# Patient Record
Sex: MALE | Race: WHITE | NOT HISPANIC OR LATINO
[De-identification: names, ages, dates, MRNs, and addresses within clinical notes are randomized per-mention and may not be internally consistent; named-entity substitution may affect disease eponyms.]

---

## 2021-07-13 PROBLEM — Z00.00 ENCOUNTER FOR PREVENTIVE HEALTH EXAMINATION: Status: ACTIVE | Noted: 2021-07-13

## 2021-08-02 ENCOUNTER — APPOINTMENT (OUTPATIENT)
Dept: SURGERY | Facility: CLINIC | Age: 46
End: 2021-08-02
Payer: MEDICAID

## 2021-08-02 VITALS
HEIGHT: 67 IN | HEART RATE: 97 BPM | SYSTOLIC BLOOD PRESSURE: 153 MMHG | WEIGHT: 243 LBS | OXYGEN SATURATION: 95 % | DIASTOLIC BLOOD PRESSURE: 84 MMHG | BODY MASS INDEX: 38.14 KG/M2 | TEMPERATURE: 97.3 F

## 2021-08-02 DIAGNOSIS — Z80.1 FAMILY HISTORY OF MALIGNANT NEOPLASM OF TRACHEA, BRONCHUS AND LUNG: ICD-10-CM

## 2021-08-02 DIAGNOSIS — Z83.3 FAMILY HISTORY OF DIABETES MELLITUS: ICD-10-CM

## 2021-08-02 DIAGNOSIS — Z78.9 OTHER SPECIFIED HEALTH STATUS: ICD-10-CM

## 2021-08-02 PROCEDURE — 99072 ADDL SUPL MATRL&STAF TM PHE: CPT

## 2021-08-02 PROCEDURE — 99243 OFF/OP CNSLTJ NEW/EST LOW 30: CPT

## 2021-08-02 NOTE — CONSULT LETTER
[Dear  ___] : Dear  [unfilled], [Consult Letter:] : I had the pleasure of evaluating your patient, [unfilled]. [Please see my note below.] : Please see my note below. [Consult Closing:] : Thank you very much for allowing me to participate in the care of this patient.  If you have any questions, please do not hesitate to contact me. [Sincerely,] : Sincerely, [FreeTextEntry3] : Francis Steward MD, FACS\par  \par Department of Surgery\par [facility]\par [facility]\par

## 2021-08-02 NOTE — PHYSICAL EXAM
[JVD] : no jugular venous distention  [No Rash or Lesion] : No rash or lesion [Purpura] : no purpura  [Petechiae] : no petechiae [Skin Ulcer] : no ulcer [Skin Induration] : no induration [Alert] : alert [Oriented to Person] : oriented to person [Oriented to Place] : oriented to place [Oriented to Time] : oriented to time [Anxious] : anxious [de-identified] : non toxic, in no acute distress  [de-identified] : NC/AT PERRL EOMI no scleral icterus  [de-identified] : trachea midline, no gross mass  [de-identified] : no audible wheezing or stridor  [de-identified] : mildly obese, protuberant, no localizing tenderness, no guarding, no masses  [de-identified] : FROM of all extremities with no gross deformity or angulation, there is a non reducible umbilical hernia where the fascial edges are obscured and not palpable no ventral hernia is noted  [de-identified] : mood is mildly anxious

## 2021-08-02 NOTE — HISTORY OF PRESENT ILLNESS
[de-identified] : The patient comes to the office in consultation by Dr. Tricia Vazquez for evaluation of mid abdominal pain and a lump in the belly button. The patient states that he was doing sit ups at te gym and developed a lump that popped out of the belly button.  The patient reports that the lump had grown in size and at times causes a burning pain. He has a history of intermittent mid abdominal pain following meals, when he gets bloated or constipated he notes that the hernia is more sensitive.

## 2021-08-11 ENCOUNTER — APPOINTMENT (OUTPATIENT)
Dept: CT IMAGING | Facility: CLINIC | Age: 46
End: 2021-08-11
Payer: MEDICAID

## 2021-08-11 ENCOUNTER — OUTPATIENT (OUTPATIENT)
Dept: OUTPATIENT SERVICES | Facility: HOSPITAL | Age: 46
LOS: 1 days | End: 2021-08-11
Payer: COMMERCIAL

## 2021-08-11 DIAGNOSIS — R10.9 UNSPECIFIED ABDOMINAL PAIN: ICD-10-CM

## 2021-08-11 PROCEDURE — 74177 CT ABD & PELVIS W/CONTRAST: CPT | Mod: 26

## 2021-08-11 PROCEDURE — 74177 CT ABD & PELVIS W/CONTRAST: CPT

## 2021-09-13 ENCOUNTER — APPOINTMENT (OUTPATIENT)
Dept: SURGERY | Facility: CLINIC | Age: 46
End: 2021-09-13
Payer: MEDICAID

## 2021-09-13 VITALS
TEMPERATURE: 97.7 F | HEIGHT: 67 IN | DIASTOLIC BLOOD PRESSURE: 81 MMHG | WEIGHT: 233 LBS | RESPIRATION RATE: 18 BRPM | SYSTOLIC BLOOD PRESSURE: 132 MMHG | OXYGEN SATURATION: 97 % | HEART RATE: 90 BPM | BODY MASS INDEX: 36.57 KG/M2

## 2021-09-13 PROCEDURE — 99072 ADDL SUPL MATRL&STAF TM PHE: CPT

## 2021-09-13 PROCEDURE — 99214 OFFICE O/P EST MOD 30 MIN: CPT

## 2021-09-13 NOTE — DATA REVIEWED
[FreeTextEntry1] : Independent review of the abd/pel CT scan reveals a 2.5 by 2.7 cm umbilical hernia defect containing a loop of unobstructed small bowel, no bowel obstruction or free fluid is seen

## 2021-09-13 NOTE — HISTORY OF PRESENT ILLNESS
[de-identified] : The patient returns to the office with no pain or discomfort.  The patient is without abdominal pain, nausea, or vomit.  The patient has been able to reduce the hernia with relaxation.

## 2021-09-13 NOTE — PHYSICAL EXAM
[JVD] : no jugular venous distention  [No Rash or Lesion] : No rash or lesion [Purpura] : no purpura  [Petechiae] : no petechiae [Skin Ulcer] : no ulcer [Skin Induration] : no induration [Alert] : alert [Oriented to Person] : oriented to person [Oriented to Place] : oriented to place [Oriented to Time] : oriented to time [Anxious] : anxious [de-identified] : non toxic, in no acute distress  [de-identified] : NC/AT PERRL EOMI no scleral icterus  [de-identified] : no audible wheezing or stridor  [de-identified] : trachea midline, no gross mass  [de-identified] : mildly obese, protuberant, no localizing tenderness, no guarding, no masses  [de-identified] : FROM of all extremities with no gross deformity or angulation, there remains a non tender reducible umbilical hernia where the fascial edges are obscured and not palpable no ventral hernia is noted  [de-identified] : mood is mildly anxious

## 2021-09-13 NOTE — ASSESSMENT
[FreeTextEntry1] : The patient is an obese 46 year old male with a 2.7 by 2.6 cm umbilical hernia.  He has been advised that he will benefit from further weight loss to facilitate repair of the hernia with less tension.  The patient has been advised that weight loss will be an important adjunct to help potentially reduce the risks of infection, hernia recurrence, and chronic post operative pain associated with surgery by potentially reducing the tension along the abdominal wall. The risks, benefits, and alternatives including the option of doing nothing to a robotic, possible laparoscopic, and possible open umbilical hernia repair with  mesh were discussed.  The potential complications including but not limited to infection, bleeding, hernia recurrence, chronic post-operative pain, and seroma formation were discussed.  The patient was educated regarding the signs and symptoms of hernia strangulation and advised to seek immediate MD evaluation should this occur.  The patient understands and will continue his efforts with weight loss.  I believe that if he is able to lose another 15 pounds, we should be safe to proceed with repair.  A total of 30 minutes was spent coordinating the care of the patient. \par

## 2021-12-13 ENCOUNTER — APPOINTMENT (OUTPATIENT)
Dept: SURGERY | Facility: CLINIC | Age: 46
End: 2021-12-13

## 2022-03-13 ENCOUNTER — EMERGENCY (EMERGENCY)
Facility: HOSPITAL | Age: 47
LOS: 1 days | Discharge: LEFT BEFORE DISCHARGE | End: 2022-03-13
Attending: EMERGENCY MEDICINE
Payer: COMMERCIAL

## 2022-03-13 VITALS
RESPIRATION RATE: 20 BRPM | OXYGEN SATURATION: 98 % | HEIGHT: 67 IN | WEIGHT: 220.02 LBS | DIASTOLIC BLOOD PRESSURE: 96 MMHG | TEMPERATURE: 98 F | HEART RATE: 115 BPM | SYSTOLIC BLOOD PRESSURE: 150 MMHG

## 2022-03-13 VITALS
SYSTOLIC BLOOD PRESSURE: 155 MMHG | HEART RATE: 93 BPM | OXYGEN SATURATION: 98 % | DIASTOLIC BLOOD PRESSURE: 89 MMHG | RESPIRATION RATE: 20 BRPM

## 2022-03-13 PROCEDURE — 99284 EMERGENCY DEPT VISIT MOD MDM: CPT

## 2022-03-13 PROCEDURE — 99283 EMERGENCY DEPT VISIT LOW MDM: CPT

## 2022-03-13 PROCEDURE — 36000 PLACE NEEDLE IN VEIN: CPT

## 2022-03-13 RX ORDER — SODIUM CHLORIDE 9 MG/ML
1000 INJECTION, SOLUTION INTRAVENOUS ONCE
Refills: 0 | Status: COMPLETED | OUTPATIENT
Start: 2022-03-13 | End: 2022-03-13

## 2022-03-13 RX ADMIN — SODIUM CHLORIDE 1000 MILLILITER(S): 9 INJECTION, SOLUTION INTRAVENOUS at 19:52

## 2022-03-13 NOTE — ED PROVIDER NOTE - ATTENDING CONTRIBUTION TO CARE
Guadalupe: I performed a face to face bedside interview with patient regarding history of present illness, review of symptoms and past medical history. I completed an independent physical exam.  I have discussed patient's plan of care with resident.   I agree with note as stated above including HISTORY OF PRESENT ILLNESS, HIV, PAST MEDICAL/SURGICAL/FAMILY/SOCIAL HISTORY, ALLERGIES AND HOME MEDICATIONS, REVIEW OF SYSTEMS, PHYSICAL EXAM, MEDICAL DECISION MAKING and any PROGRESS NOTES during the time I functioned as the attending physician for this patient unless otherwise noted. My brief assessment is as follows: Guadalupe DELGADO: 46M h/o drug use p/w palpitations and anxiety. Last PCP use yesterday, no drug use today. Denies CP, SOB, nausea,  vomiting, lightheadedness (unlike triage note). Exam notable for tachycardia with regular rhythm, CTAB, benign neuro exam. Plan for ecg, labs, IVF, reassess.

## 2022-03-13 NOTE — ED PROVIDER NOTE - PROGRESS NOTE DETAILS
Guadalupe DELGADO: patient approached me at doctor's station stating "I am dying, please get vitals, I'm about to die". Patient escorted back to his room, in no apparent distress. Appears anxious. Patient tachycardic, saturating 98% on RA. Nurse at bedside obtaining labs/ecg. Patient placed on monitor. Guadalupe DELGADO: patient's HR in the 90s, no longer anxious, feels better, VSS. Patient no longer wants to stay and be worked up. Is AAOx4. Asked patient to stay for an ECG, upon going back to my computer patient got up (IV had already been removed) and eloped from the ED. Adrianne: Pt coming out of room. Needed encouragement to return. Saying he wasn't feeling right but continued to refused any anxiolytic medication.

## 2022-03-13 NOTE — ED PROVIDER NOTE - OBJECTIVE STATEMENT
46 y m with pmh of PCP abuse presenting for nausea. At interview pt is anxious appearing and saying he does feel right. Initially refused to be interviewed saying "I don't feel right. I feel like I'm dying. I won't say any more". When asked if he was having nausea, cp, sob, or ab pain he denied. Last PCP use was yesterday. Says he has been using PCP every day but wouldn't say if he used today or when last use was. 46 y m with pmh of PCP abuse presenting for palpitations. At interview pt is anxious appearing and saying he does feel right. Initially refused to be interviewed saying "I don't feel right. I feel like I'm dying. I won't say any more". When asked if he was having nausea, cp, sob, or ab pain he denied. Last PCP use was yesterday. Says he has been using PCP every day, last use yesterday.

## 2022-03-13 NOTE — ED ADULT TRIAGE NOTE - CHIEF COMPLAINT QUOTE
Patient BIBA to ED today with c/o nausea and lightheadedness.  Patient reports he used "dilcia dust" last night and this AM started to have the symptoms.

## 2022-03-13 NOTE — ED PROVIDER NOTE - PHYSICAL EXAMINATION
General: well appearing, anxious appearing  Head: NC, AT  EENT: EOMI, no scleral icterus  Cardiac: RRR, no apparent murmurs, no lower extremity edema  Respiratory: CTABL, no respiratory distress  Abdomen: soft, ND, NT, nonperitonitic  MSK/Vascular: full ROM, soft compartments, warm extremities  Neuro: AAOx3, sensation to light touch intact  Psych: anxious, uncooperative General: well appearing, anxious appearing  Head: NC, AT  EENT: EOMI, no scleral icterus  Cardiac: tachycardic, regular rhythm  Respiratory: CTABL, no respiratory distress  Abdomen: soft, ND, NT, nonperitonitic  MSK/Vascular: full ROM, soft compartments, warm extremities  Neuro: AAOx3, sensation to light touch intact  Psych: anxious, uncooperative

## 2022-03-13 NOTE — ED PROVIDER NOTE - CLINICAL SUMMARY MEDICAL DECISION MAKING FREE TEXT BOX
Guadalupe DELGADO: 46M h/o drug use p/w palpitations and anxiety. Last PCP use yesterday, no drug use today. Denies CP, SOB, nausea,  vomiting, lightheadedness (unlike triage note). Exam notable for tachycardia with regular rhythm, CTAB, benign neuro exam. Plan for ecg, labs, IVF, reassess.

## 2022-03-13 NOTE — ED ADULT NURSE NOTE - OBJECTIVE STATEMENT
pt a&ox3 states he came in because he doesn't feel right in the head pt placed on CM Sinus tach and SPO2 WNL. pt uncooperative with RN at time of assessment  refused to answer any question and refused EKG MD made aware vitals retaken and iv dc'd by RN pt states he is going home and walked out of Emergency Department.

## 2022-05-16 PROBLEM — Z00.00 ENCOUNTER FOR PREVENTIVE HEALTH EXAMINATION: Noted: 2022-05-16

## 2022-05-17 ENCOUNTER — APPOINTMENT (OUTPATIENT)
Dept: CARDIOLOGY | Facility: CLINIC | Age: 47
End: 2022-05-17

## 2022-05-17 DIAGNOSIS — F19.11 OTHER PSYCHOACTIVE SUBSTANCE ABUSE, IN REMISSION: ICD-10-CM

## 2022-05-17 DIAGNOSIS — Z87.898 PERSONAL HISTORY OF OTHER SPECIFIED CONDITIONS: ICD-10-CM

## 2022-05-17 DIAGNOSIS — R42 DIZZINESS AND GIDDINESS: ICD-10-CM

## 2022-05-23 ENCOUNTER — APPOINTMENT (OUTPATIENT)
Dept: CARDIOLOGY | Facility: CLINIC | Age: 47
End: 2022-05-23

## 2022-06-30 ENCOUNTER — APPOINTMENT (OUTPATIENT)
Dept: CARDIOLOGY | Facility: CLINIC | Age: 47
End: 2022-06-30

## 2022-06-30 ENCOUNTER — NON-APPOINTMENT (OUTPATIENT)
Age: 47
End: 2022-06-30

## 2022-06-30 VITALS — SYSTOLIC BLOOD PRESSURE: 100 MMHG | DIASTOLIC BLOOD PRESSURE: 74 MMHG

## 2022-06-30 VITALS
OXYGEN SATURATION: 97 % | HEIGHT: 67 IN | WEIGHT: 218 LBS | DIASTOLIC BLOOD PRESSURE: 70 MMHG | HEART RATE: 117 BPM | TEMPERATURE: 99.2 F | SYSTOLIC BLOOD PRESSURE: 100 MMHG | BODY MASS INDEX: 34.21 KG/M2

## 2022-06-30 DIAGNOSIS — Z78.9 OTHER SPECIFIED HEALTH STATUS: ICD-10-CM

## 2022-06-30 DIAGNOSIS — Z86.69 PERSONAL HISTORY OF OTHER DISEASES OF THE NERVOUS SYSTEM AND SENSE ORGANS: ICD-10-CM

## 2022-06-30 DIAGNOSIS — T40.5X1A: ICD-10-CM

## 2022-06-30 DIAGNOSIS — J96.01 ACUTE RESPIRATORY FAILURE WITH HYPOXIA: ICD-10-CM

## 2022-06-30 DIAGNOSIS — T50.905A UNSPECIFIED CONVULSIONS: ICD-10-CM

## 2022-06-30 DIAGNOSIS — F14.90 COCAINE USE, UNSPECIFIED, UNCOMPLICATED: ICD-10-CM

## 2022-06-30 DIAGNOSIS — Z87.19 PERSONAL HISTORY OF OTHER DISEASES OF THE DIGESTIVE SYSTEM: ICD-10-CM

## 2022-06-30 DIAGNOSIS — R56.9 UNSPECIFIED CONVULSIONS: ICD-10-CM

## 2022-06-30 DIAGNOSIS — Z72.89 OTHER PROBLEMS RELATED TO LIFESTYLE: ICD-10-CM

## 2022-06-30 DIAGNOSIS — Z87.898 PERSONAL HISTORY OF OTHER SPECIFIED CONDITIONS: ICD-10-CM

## 2022-06-30 PROCEDURE — 93000 ELECTROCARDIOGRAM COMPLETE: CPT

## 2022-06-30 PROCEDURE — 99204 OFFICE O/P NEW MOD 45 MIN: CPT

## 2022-06-30 RX ORDER — LEVETIRACETAM 500 MG/1
500 TABLET, FILM COATED ORAL
Qty: 60 | Refills: 0 | Status: DISCONTINUED | COMMUNITY
Start: 2022-05-12

## 2022-06-30 RX ORDER — CARVEDILOL 3.12 MG/1
3.12 TABLET, FILM COATED ORAL
Qty: 60 | Refills: 0 | Status: DISCONTINUED | COMMUNITY
Start: 2022-06-24 | End: 2022-06-30

## 2022-06-30 NOTE — HISTORY OF PRESENT ILLNESS
[FreeTextEntry1] : 48 y/o M with recent cardiac arrest secondary to PCP and cocaine use, which patient states occurred secondary to drug induced seizure. He was admitted to [facility] 5/2-5/12. States he had a TTE, but denies any other cardiac workup. Patient then developed leg pain ~10 days ago, was found to have acute DVT/PE. He was started on eliquis on d/c. He denies any PMH prior to this episode. He currently denies CP, dyspnea, palpitations, dizziness, lightheadedness, or LE edema. ET 1 mile. Denies any family history of premature CAD or sudden cardiac death. Non smoker. Intermittent binge drinker. States he stopped using cocaine and PCP.

## 2022-06-30 NOTE — ASSESSMENT
[FreeTextEntry1] : 48 y/o M with recent cardiac arrest secondary to PCP and cocaine use, drug induced seizure, subsequent DVT/PE.\par \par #Cardiac arrest\par Will request records from [facility] \par SR, no ischemic changes on EKG \par TTE ordered\par Will order CCTA for further evaluation \par BMP ordered\par Metoprolol 50mg q12 the day before, and 100mg to be taken the morning of procedure \par \par #DVT/PE\par Diagnosed following admission for cardiac arrest \par On eliquis. Denies bleeding issues \par TTE ordered to evaluate for RV strain\par \par #HTN\par /74\par Will d/c coreg due to borderline BP, switch to Toprol XL 25mg \par If TTE is normal, may d/c beta blocker\par Patient counseled on the risks of using cocaine while on beta blocker, maintains that he hasn't used since his hospitalization \par \par #Obesity\par BMI 34\par Diet, lifestyle modifications discussed\par Lipid panel ordered\par \par RTC 1-2 months

## 2022-07-12 ENCOUNTER — APPOINTMENT (OUTPATIENT)
Dept: CARDIOLOGY | Facility: CLINIC | Age: 47
End: 2022-07-12

## 2022-07-12 PROCEDURE — 93306 TTE W/DOPPLER COMPLETE: CPT

## 2022-07-13 LAB
ANION GAP SERPL CALC-SCNC: 16 MMOL/L
BASOPHILS # BLD AUTO: 0.04 K/UL
BASOPHILS NFR BLD AUTO: 0.7 %
BUN SERPL-MCNC: 15 MG/DL
CALCIUM SERPL-MCNC: 10.1 MG/DL
CHLORIDE SERPL-SCNC: 101 MMOL/L
CHOLEST SERPL-MCNC: 217 MG/DL
CO2 SERPL-SCNC: 25 MMOL/L
CREAT SERPL-MCNC: 1.13 MG/DL
EGFR: 80 ML/MIN/1.73M2
EOSINOPHIL # BLD AUTO: 0.13 K/UL
EOSINOPHIL NFR BLD AUTO: 2.1 %
GLUCOSE SERPL-MCNC: 78 MG/DL
HCT VFR BLD CALC: 54.7 %
HDLC SERPL-MCNC: 68 MG/DL
HGB BLD-MCNC: 18.4 G/DL
IMM GRANULOCYTES NFR BLD AUTO: 0.2 %
LDLC SERPL CALC-MCNC: 120 MG/DL
LYMPHOCYTES # BLD AUTO: 2.05 K/UL
LYMPHOCYTES NFR BLD AUTO: 33.9 %
MAN DIFF?: NORMAL
MCHC RBC-ENTMCNC: 31.4 PG
MCHC RBC-ENTMCNC: 33.6 GM/DL
MCV RBC AUTO: 93.3 FL
MONOCYTES # BLD AUTO: 0.52 K/UL
MONOCYTES NFR BLD AUTO: 8.6 %
NEUTROPHILS # BLD AUTO: 3.3 K/UL
NEUTROPHILS NFR BLD AUTO: 54.5 %
NONHDLC SERPL-MCNC: 150 MG/DL
PLATELET # BLD AUTO: 209 K/UL
POTASSIUM SERPL-SCNC: 3.2 MMOL/L
RBC # BLD: 5.86 M/UL
RBC # FLD: 12.8 %
SODIUM SERPL-SCNC: 142 MMOL/L
TRIGL SERPL-MCNC: 148 MG/DL
WBC # FLD AUTO: 6.05 K/UL

## 2022-07-23 PROBLEM — F16.10 HALLUCINOGEN ABUSE, UNCOMPLICATED: Chronic | Status: ACTIVE | Noted: 2022-03-13

## 2022-07-28 ENCOUNTER — APPOINTMENT (OUTPATIENT)
Dept: CARDIOLOGY | Facility: CLINIC | Age: 47
End: 2022-07-28

## 2022-07-28 ENCOUNTER — NON-APPOINTMENT (OUTPATIENT)
Age: 47
End: 2022-07-28

## 2022-07-28 VITALS
DIASTOLIC BLOOD PRESSURE: 78 MMHG | TEMPERATURE: 98.6 F | BODY MASS INDEX: 34.94 KG/M2 | OXYGEN SATURATION: 95 % | HEIGHT: 67 IN | HEART RATE: 115 BPM | WEIGHT: 222.6 LBS | SYSTOLIC BLOOD PRESSURE: 118 MMHG

## 2022-07-28 PROCEDURE — 93000 ELECTROCARDIOGRAM COMPLETE: CPT

## 2022-07-28 PROCEDURE — 99214 OFFICE O/P EST MOD 30 MIN: CPT | Mod: 25

## 2022-07-28 RX ORDER — POTASSIUM CHLORIDE 1500 MG/1
20 TABLET, FILM COATED, EXTENDED RELEASE ORAL
Qty: 4 | Refills: 0 | Status: DISCONTINUED | COMMUNITY
Start: 2022-07-13 | End: 2022-07-28

## 2022-07-28 NOTE — ASSESSMENT
[FreeTextEntry1] : 48 y/o M with recent cardiac arrest secondary to PCP and cocaine use, drug induced seizure, subsequent DVT/PE.\par \par #Cardiac arrest\par Will request records from [facility] \par SR, no ischemic changes on EKG \par TTE with LVEF 45-50%, no RWMA\par CCTA pending - patient still tachycardic. Advised to take metoprolol 100mg the night before and morning of procedure \par \par #DVT/PE\par Provoked in the setting of cardiac arrest \par No RV strain \par On eliquis. Denies bleeding issues \par Continue AC x 3 months \par \par #HTN\par Controlled\par Continue beta blocker, ARB \par Patient counseled on the risks of using cocaine while on beta blocker, maintains that he hasn't used since his hospitalization \par \par #Obesity\par BMI 34\par Diet, lifestyle modifications discussed\par Lipid panel ordered\par \par RTC 3 months.

## 2022-07-28 NOTE — CARDIOLOGY SUMMARY
[de-identified] : 7/28/22: SR, LAD, RSR', early repolarization  [de-identified] : 7/12/22: LVEF 45-50%, LVH

## 2022-07-28 NOTE — HISTORY OF PRESENT ILLNESS
[FreeTextEntry1] : 48 y/o M was admitted to [facility] 5/2-12 with cardiac arrest secondary to PCP and cocaine use, which patient states occurred secondary to drug induced seizure. States he had a TTE, but denies any other cardiac workup. Patient then developed leg pain ~10 days ago, was found to have acute DVT/PE. He was started on eliquis on d/c. He denies any PMH prior to this episode. Denies any family history of premature CAD or sudden cardiac death. Non smoker. Intermittent binge drinker. States he stopped using cocaine and PCP. \par \par 7/28/22:\par Patient went to [facility] ED 7/23 with complaints of shakiness and tachycardia; he states he had repeat chest CT and was told his blood clot was improving in size. \par Otherwise feels well with no complaints. \par He currently denies CP, dyspnea, palpitations, dizziness, lightheadedness, or LE edema.

## 2022-07-29 ENCOUNTER — APPOINTMENT (OUTPATIENT)
Dept: CT IMAGING | Facility: CLINIC | Age: 47
End: 2022-07-29

## 2022-08-15 ENCOUNTER — RX RENEWAL (OUTPATIENT)
Age: 47
End: 2022-08-15

## 2022-09-22 ENCOUNTER — APPOINTMENT (OUTPATIENT)
Dept: CARDIOLOGY | Facility: CLINIC | Age: 47
End: 2022-09-22

## 2022-10-14 ENCOUNTER — RX RENEWAL (OUTPATIENT)
Age: 47
End: 2022-10-14

## 2022-12-01 ENCOUNTER — APPOINTMENT (OUTPATIENT)
Dept: CARDIOLOGY | Facility: CLINIC | Age: 47
End: 2022-12-01

## 2022-12-01 VITALS
TEMPERATURE: 98.5 F | BODY MASS INDEX: 38.14 KG/M2 | DIASTOLIC BLOOD PRESSURE: 100 MMHG | HEIGHT: 67 IN | SYSTOLIC BLOOD PRESSURE: 140 MMHG | OXYGEN SATURATION: 95 % | WEIGHT: 243 LBS | HEART RATE: 110 BPM

## 2022-12-01 PROCEDURE — 99214 OFFICE O/P EST MOD 30 MIN: CPT | Mod: 25

## 2022-12-01 PROCEDURE — 93000 ELECTROCARDIOGRAM COMPLETE: CPT

## 2022-12-01 RX ORDER — VALSARTAN 80 MG/1
80 TABLET, COATED ORAL
Qty: 90 | Refills: 1 | Status: ACTIVE | COMMUNITY
Start: 2022-06-24 | End: 1900-01-01

## 2022-12-08 ENCOUNTER — RX CHANGE (OUTPATIENT)
Age: 47
End: 2022-12-08

## 2022-12-08 ENCOUNTER — APPOINTMENT (OUTPATIENT)
Dept: CARDIOLOGY | Facility: CLINIC | Age: 47
End: 2022-12-08

## 2022-12-08 PROCEDURE — 93970 EXTREMITY STUDY: CPT

## 2022-12-09 ENCOUNTER — NON-APPOINTMENT (OUTPATIENT)
Age: 47
End: 2022-12-09

## 2022-12-12 ENCOUNTER — NON-APPOINTMENT (OUTPATIENT)
Age: 47
End: 2022-12-12

## 2022-12-16 ENCOUNTER — NON-APPOINTMENT (OUTPATIENT)
Age: 47
End: 2022-12-16

## 2022-12-19 ENCOUNTER — NON-APPOINTMENT (OUTPATIENT)
Age: 47
End: 2022-12-19

## 2022-12-19 ENCOUNTER — APPOINTMENT (OUTPATIENT)
Dept: CT IMAGING | Facility: CLINIC | Age: 47
End: 2022-12-19
Payer: MEDICAID

## 2022-12-19 ENCOUNTER — OUTPATIENT (OUTPATIENT)
Dept: OUTPATIENT SERVICES | Facility: HOSPITAL | Age: 47
LOS: 1 days | End: 2022-12-19
Payer: COMMERCIAL

## 2022-12-19 DIAGNOSIS — I46.9 CARDIAC ARREST, CAUSE UNSPECIFIED: ICD-10-CM

## 2022-12-19 LAB
ANION GAP SERPL CALC-SCNC: 14 MMOL/L
BUN SERPL-MCNC: 15 MG/DL
CALCIUM SERPL-MCNC: 10.4 MG/DL
CHLORIDE SERPL-SCNC: 102 MMOL/L
CO2 SERPL-SCNC: 24 MMOL/L
CREAT SERPL-MCNC: 1.03 MG/DL
EGFR: 90 ML/MIN/1.73M2
GLUCOSE SERPL-MCNC: 117 MG/DL
POTASSIUM SERPL-SCNC: 4.5 MMOL/L
SODIUM SERPL-SCNC: 140 MMOL/L

## 2022-12-19 PROCEDURE — 75574 CT ANGIO HRT W/3D IMAGE: CPT | Mod: 26

## 2022-12-19 PROCEDURE — 75574 CT ANGIO HRT W/3D IMAGE: CPT

## 2023-01-31 ENCOUNTER — RX CHANGE (OUTPATIENT)
Age: 48
End: 2023-01-31

## 2023-03-21 ENCOUNTER — APPOINTMENT (OUTPATIENT)
Dept: CARDIOLOGY | Facility: CLINIC | Age: 48
End: 2023-03-21
Payer: OTHER GOVERNMENT

## 2023-03-21 ENCOUNTER — NON-APPOINTMENT (OUTPATIENT)
Age: 48
End: 2023-03-21

## 2023-03-21 VITALS
HEART RATE: 92 BPM | SYSTOLIC BLOOD PRESSURE: 110 MMHG | OXYGEN SATURATION: 99 % | DIASTOLIC BLOOD PRESSURE: 80 MMHG | WEIGHT: 244 LBS | BODY MASS INDEX: 38.22 KG/M2

## 2023-03-21 DIAGNOSIS — E87.6 HYPOKALEMIA: ICD-10-CM

## 2023-03-21 DIAGNOSIS — R10.9 UNSPECIFIED ABDOMINAL PAIN: ICD-10-CM

## 2023-03-21 DIAGNOSIS — R60.9 EDEMA, UNSPECIFIED: ICD-10-CM

## 2023-03-21 DIAGNOSIS — Z87.19 PERSONAL HISTORY OF OTHER DISEASES OF THE DIGESTIVE SYSTEM: ICD-10-CM

## 2023-03-21 DIAGNOSIS — E66.9 OBESITY, UNSPECIFIED: ICD-10-CM

## 2023-03-21 DIAGNOSIS — I46.9 CARDIAC ARREST, CAUSE UNSPECIFIED: ICD-10-CM

## 2023-03-21 DIAGNOSIS — I10 ESSENTIAL (PRIMARY) HYPERTENSION: ICD-10-CM

## 2023-03-21 DIAGNOSIS — K42.9 UMBILICAL HERNIA W/OUT OBSTRUCTION OR GANGRENE: ICD-10-CM

## 2023-03-21 DIAGNOSIS — I26.99 OTHER PULMONARY EMBOLISM W/OUT ACUTE COR PULMONALE: ICD-10-CM

## 2023-03-21 DIAGNOSIS — I82.409 ACUTE EMBOLISM AND THROMBOSIS OF UNSPECIFIED DEEP VEINS OF UNSPECIFIED LOWER EXTREMITY: ICD-10-CM

## 2023-03-21 PROCEDURE — 93306 TTE W/DOPPLER COMPLETE: CPT

## 2023-03-21 PROCEDURE — 93000 ELECTROCARDIOGRAM COMPLETE: CPT

## 2023-03-21 PROCEDURE — 99072 ADDL SUPL MATRL&STAF TM PHE: CPT

## 2023-03-21 PROCEDURE — 76376 3D RENDER W/INTRP POSTPROCES: CPT

## 2023-03-21 PROCEDURE — 99215 OFFICE O/P EST HI 40 MIN: CPT

## 2023-03-21 RX ORDER — OMEPRAZOLE 40 MG/1
40 CAPSULE, DELAYED RELEASE ORAL
Qty: 90 | Refills: 3 | Status: ACTIVE | COMMUNITY

## 2023-03-21 RX ORDER — METOPROLOL SUCCINATE 25 MG/1
25 TABLET, EXTENDED RELEASE ORAL
Qty: 90 | Refills: 1 | Status: DISCONTINUED | COMMUNITY
Start: 2022-06-30 | End: 2023-03-21

## 2023-03-21 RX ORDER — DEXTRAN 70/HYPROMELLOSE 0.1%-0.3%
0.1-0.3 DROPS OPHTHALMIC (EYE)
Refills: 0 | Status: ACTIVE | COMMUNITY

## 2023-03-21 RX ORDER — METOPROLOL SUCCINATE 100 MG/1
100 TABLET, EXTENDED RELEASE ORAL
Qty: 2 | Refills: 0 | Status: DISCONTINUED | COMMUNITY
Start: 2022-12-01 | End: 2023-03-21

## 2023-03-21 RX ORDER — METOPROLOL TARTRATE 25 MG/1
25 TABLET, FILM COATED ORAL DAILY
Refills: 0 | Status: ACTIVE | COMMUNITY

## 2023-03-21 RX ORDER — ASPIRIN 81 MG
81 TABLET, DELAYED RELEASE (ENTERIC COATED) ORAL DAILY
Refills: 0 | Status: ACTIVE | COMMUNITY

## 2023-03-21 RX ORDER — APIXABAN 5 MG/1
5 TABLET, FILM COATED ORAL
Qty: 180 | Refills: 1 | Status: DISCONTINUED | COMMUNITY
Start: 2022-06-23 | End: 2023-03-21

## 2023-03-21 NOTE — ASSESSMENT
[FreeTextEntry1] : Mild edema of the legs bilaterally -none now; he has no symptoms of PND, orthopnea; overall I do not think volume overload or CHF; echo confirmed normal LVEF and normal valvular morphology.  He does not need any diuretics.  He does not need further invasive cardiac testing.\par \par Obesity -weight reduction diet and exercise.\par \par Risk factor modification has been discussed with him at a great length in regular walking, compliance to medication, low-salt diet, leg elevation if needed.  Cardiac wise he is stable and can return to his cardiologist when out of bed..

## 2023-03-21 NOTE — PHYSICAL EXAM

## 2023-03-21 NOTE — HISTORY OF PRESENT ILLNESS
[FreeTextEntry1] : 47 years old inmate with history of cardiac arrest after cocaine use, history of DVT/PE was treated with Eliquis in the past who complained of bilateral edema of the legs referred for cardiac evaluation.  Today he has no edema of the legs.  He denies any active chest pain or shortness of breath.  Denies PND, orthopnea, diaphoresis, dizziness, palpitations, claudication symptoms.\par \par He does not follow diet well.